# Patient Record
(demographics unavailable — no encounter records)

---

## 2025-05-28 NOTE — HISTORY OF PRESENT ILLNESS
[de-identified] : 45 yo male with right hand pain since Sunday. He was catching football when his leg got caught and he tried to catch himself fell on his right hand. He was seen at the urgent care on Monday. Xray's were negative. They placed him a splint for comfort, he has ecchymosis with swelling. He has been icing and taking NSAIDS for pain. Denies any other complaints today.

## 2025-05-28 NOTE — DISCUSSION/SUMMARY
[de-identified] : Discussed findings of today's exam and possible causes of patient's pain.  Educated patient on their most probable diagnosis of right hand contusion and fifth digit MCP sprain status post fall.  Reviewed possible courses of treatment, and we collaboratively decided best course of treatment at this time will include conservative management.  Patient has significant ecchymosis throughout the fifth digit, as well as into the fourth digit and palm of the hand.  This is more than would be expected with a mild sprain, while there is no apparent fracture on x-ray, his clinical exam findings are consistent with what would be expected with the fracture.  At this time patient is prescribed an ulnar gutter splint to be utilized for short-term immobilization and stabilization of his hand contusion and fifth MCP joint sprain.  Patient has no apparent myotendinous tear or rupture, no evidence of ligament tear or instability of the MCP or IP joints.  No immediate need for MRI at this time.  Recommend utilization of ulnar gutter splint for the next 1-2 weeks.  If patient has interval improvement but not yet resolution may consider course of hand therapy at that time.  If patient continues to have pain, swelling and/or dysfunction would recommend follow-up in 3 weeks for reassessment.  Patient appreciates and agrees with current plan.  This note was generated using dragon medical dictation software.  A reasonable effort has been made for proofreading its contents, but typos may still remain.  If there are any questions or points of clarification needed please notify my office.